# Patient Record
Sex: FEMALE | Race: OTHER
[De-identification: names, ages, dates, MRNs, and addresses within clinical notes are randomized per-mention and may not be internally consistent; named-entity substitution may affect disease eponyms.]

---

## 2020-03-27 ENCOUNTER — HOSPITAL ENCOUNTER (EMERGENCY)
Dept: HOSPITAL 60 - LB.ED | Age: 70
Discharge: HOME | End: 2020-03-27
Payer: MEDICARE

## 2020-03-27 VITALS — HEART RATE: 50 BPM | SYSTOLIC BLOOD PRESSURE: 129 MMHG | DIASTOLIC BLOOD PRESSURE: 60 MMHG

## 2020-03-27 DIAGNOSIS — R07.9: Primary | ICD-10-CM

## 2020-03-27 DIAGNOSIS — Z79.899: ICD-10-CM

## 2020-03-27 DIAGNOSIS — R07.89: ICD-10-CM

## 2020-03-27 RX ADMIN — NITROGLYCERIN ONE MG: 0.4 TABLET SUBLINGUAL at 07:54

## 2020-03-27 RX ADMIN — NITROGLYCERIN ONE MG: 0.4 TABLET SUBLINGUAL at 07:44

## 2020-03-27 RX ADMIN — LORAZEPAM ONE MG: 2 INJECTION INTRAMUSCULAR; INTRAVENOUS at 08:02

## 2020-03-27 NOTE — EDM.PDOC
ED HPI GENERAL MEDICAL PROBLEM





- General


Chief Complaint: Chest Pain


Stated Complaint: CHEST PAIN


Time Seen by Provider: 03/27/20 07:45


Source of Information: Reports: Patient


History Limitations: Reports: No Limitations





- History of Present Illness


INITIAL COMMENTS - FREE TEXT/NARRATIVE: 





This patient presents to the ED for evaluation of chest pain. She states that 

she was sitting in her chair watching the news this morning at 0630 when she 

developed a sharp pain in her back and chest. She states the pain is the worst 

that she has had and has not changed in intensity. On arrival she rates the 

pain at 10/10 and continues primary in her left anterior chest and some in her 

back. It does not radiate up her neck or down her arms. She felt a bit short of 

breath but denies any nausea, vomiting, or diarrhea. She denies recent illness 

including fever, cough, sore throat. She does take HCTZ for "swelling in my 

ankles" but denies any health problems. Patient is a heavy smoker at 1.5 packs 

per day.


Onset: Today, Sudden


Onset Date: 03/27/20


Onset Time: 06:30


Duration: Heavy


Location: Reports: Chest


Quality: Reports: Stabbing


Severity: Severe


Improves with: Reports: None


Worsens with: Reports: None


Associated Symptoms: Reports: No Other Symptoms





- Related Data


 Allergies











Allergy/AdvReac Type Severity Reaction Status Date / Time


 


No Known Allergies Allergy   Verified 03/27/20 08:30











Home Meds: 


 Home Meds





Hydrochlorothiazide 25 mg PO DAILY 02/06/15 [History]











Past Medical History


Musculoskeletal History: Reports: Arthritis





- Past Surgical History


Musculoskeletal Surgical History: Reports: Shoulder Surgery


Other Musculoskeletal Surgeries/Procedures:: Right RTC repair 3/2017





ED ROS GENERAL





- Review of Systems


Review Of Systems: See Below


Constitutional: Denies: Fever, Malaise, Weakness, Fatigue


HEENT: Denies: Ear Pain, Eye Pain, Throat Pain


Respiratory: Reports: Shortness of Breath.  Denies: Cough, Sputum


Cardiovascular: Reports: Chest Pain, Blood Pressure Problem, Dyspnea on 

Exertion.  Denies: Edema, Palpitations


GI/Abdominal: Denies: Abdominal Pain, Diarrhea, Nausea, Vomiting


Musculoskeletal: Reports: No Symptoms


Skin: Reports: No Symptoms


Neurological: Reports: No Symptoms





ED EXAM, GENERAL





- Physical Exam


Exam: See Below


Exam Limited By: No Limitations


General Appearance: Alert, WD/WN, Anxious, Moderate Distress


Eye Exam: Bilateral Eye: PERRL


Ears: Normal External Exam


Nose: Normal Inspection


Throat/Mouth: Normal Inspection


Head: Atraumatic, Normocephalic


Neck: Normal Inspection, Supple, Non-Tender, Full Range of Motion


Respiratory/Chest: No Respiratory Distress, Lungs Clear, Normal Breath Sounds


Cardiovascular: Normal Peripheral Pulses, Regular Rate, Rhythm


Extremities: Normal Inspection


Neurological: Alert, Oriented





Course





- Vital Signs


Last Recorded V/S: 


 Last Vital Signs











Temp  36.9 C   03/27/20 07:43


 


Pulse  50 L  03/27/20 10:42


 


Resp  20   03/27/20 10:42


 


BP  129/60   03/27/20 10:42


 


Pulse Ox  94 L  03/27/20 08:17














- Orders/Labs/Meds


Orders: 


 Active Orders 24 hr











 Category Date Time Status


 


 EKG Documentation Completion [RC] ASDIRECTED Care  03/27/20 07:54 Active


 


 EKG Documentation Completion [RC] ASDIRECTED Care  03/27/20 10:27 Active


 


 Chest w Cont [CT] Stat Exams  03/27/20 08:23 Taken


 


 Iodixanol [Visipaque 320] Med  03/27/20 08:44 Active





 100 ml IV .AS DIRECTED PRN   


 


 Sodium Chloride 0.9% [Normal Saline] Med  03/27/20 08:45 Active





 50 ml FLUSH ONETIME   


 


 Sodium Chloride 0.9% [Normal Saline] 1,000 ml Med  03/27/20 08:10 Active





 IV ASDIRECTED   








 Medication Orders





Sodium Chloride (Normal Saline)  1,000 mls @ 0 mls/hr IV ASDIRECTED KAR


   Last Admin: 03/27/20 08:00  Dose: 30 mls/hr


Iodixanol (Visipaque 320)  100 ml IV .AS DIRECTED PRN


   PRN Reason: RADIOLOGY EXAM


   Stop: 03/28/20 08:45


Sodium Chloride (Normal Saline)  50 ml FLUSH ONETIME [facility]








Labs: 


 Laboratory Tests











  03/27/20 03/27/20 03/27/20 Range/Units





  07:54 07:54 10:45 


 


WBC  7.1    (4.0-11.0)  K/uL


 


RBC  4.71    (3.80-5.80)  M/uL


 


Hgb  13.2    (11.5-16.5)  g/dL


 


Hct  40.9    (37.0-47.0)  %


 


MCV  87    (76-96)  fL


 


MCH  28.0    (27.0-32.0)  pg


 


MCHC  32.3    (31.0-35.0)  g/dL


 


RDW  15.8    (11.0-16.0)  %


 


Plt Count  250    (150-500)  K/uL


 


MPV  9.2    (6.0-10.0)  fL


 


Neut % (Auto)  55.6    (45.0-70.0)  %


 


Lymph % (Auto)  30.1    (20.0-40.0)  %


 


Mono % (Auto)  12.6 H    (3.0-10.0)  %


 


Eos % (Auto)  1.4    (1.0-5.0)  %


 


Baso % (Auto)  0.3    (0.0-0.5)  %


 


Neut # (Auto)  3.93    (2.00-7.50)  K/uL


 


Lymph # (Auto)  2.13    (1.50-4.00)  K/uL


 


Mono # (Auto)  0.89 H    (0.20-0.80)  K/uL


 


Eos # (Auto)  0.10    (0.04-0.40)  K/uL


 


Baso # (Auto)  0.02    (0.02-0.10)  K/uL


 


Sodium   139   (136-145)  mmol/L


 


Potassium   3.5   (3.5-5.1)  mmol/L


 


Chloride   101   ()  mmol/L


 


Carbon Dioxide   27.8   (21.0-32.0)  mmol/L


 


Anion Gap   13.7   (5.0-15.0)  mmol/L


 


BUN   23   (8-26)  mg/dL


 


Creatinine   0.78   (0.55-1.02)  mg/dL


 


Est Cr Clr Drug Dosing   TNP   


 


Estimated GFR (MDRD)   > 60   (>60)  MLS/MIN


 


BUN/Creatinine Ratio   29.5 H   (6-25)  


 


Glucose   84   ()  mg/dL


 


Calcium   9.1   (8.5-10.1)  mg/dL


 


Troponin I   < 0.017  < 0.017  (0.000-0.060)  ng/mL











Meds: 


Medications











Generic Name Dose Route Start Last Admin





  Trade Name Freq  PRN Reason Stop Dose Admin


 


Sodium Chloride  1,000 mls @ 0 mls/hr  03/27/20 08:10  03/27/20 08:00





  Normal Saline  IV   30 mls/hr





  ASDIRECTED KAR   Administration





     





     





     





  KVO   


 


Iodixanol  100 ml  03/27/20 08:44  





  Visipaque 320  IV  03/28/20 08:45  





  .AS DIRECTED PRN   





  RADIOLOGY EXAM   





     





     





     


 


Sodium Chloride  50 ml  03/27/20 08:45  





  Normal Saline  FLUSH   





  ONETIME KAR   





     





     





     





     














Discontinued Medications














Generic Name Dose Route Start Last Admin





  Trade Name Fe  PRN Reason Stop Dose Admin


 


Aspirin  324 mg  03/27/20 07:56  03/27/20 07:53





  Aspirin  PO  03/27/20 07:57  324 mg





  ONETIME ONE   Administration





     





     





     





     


 


Lorazepam  1 mg  03/27/20 07:55  03/27/20 08:02





  Ativan  IVPUSH  03/27/20 07:56  1 mg





  ONETIME ONE   Administration





     





     





     





     


 


Nitroglycerin  0.4 mg  03/27/20 07:44  03/27/20 07:44





  Nitrostat  SL  03/27/20 07:45  0.4 mg





  ONETIME ONE   Administration





     





     





     





     


 


Nitroglycerin  0.4 mg  03/27/20 07:56  03/27/20 07:54





  Nitrostat  SL  03/27/20 07:57  0.4 mg





  ONETIME ONE   Administration





     





     





     





     














- Re-Assessments/Exams


Free Text/Narrative Re-Assessment/Exam: 





03/27/20 13:13


This patient presents to the ED for evaluation of chest pain. Initial 

laboratory and imaging tests have come back normal without progression of 

symptoms or other new concerning symptoms. There is no clinical, laboratory, or 

radiographic evidence of pulmonary embolism, aortic dissection, pneumonia, 

pneumothorax or cardiac ischemia. Other etiologies of chest pain could include 

chest wall source, esophageal spasm or GI source, pleuritis, referred pain, 

etc. I have no suspicion of angina at this point and would not admit at this 

time. She was completely pain free after interventions in the ED and repeat 

troponin and EKG were negative. I will  set the patient up for an outpatient 

stress echocardiogram as soon as possible. She was instructed to return 

immediately to the ED should the pain recur; she was also reminded to use 911 

as needed. The patient agrees with the plan and all questions were answered.





Departure





- Departure


Time of Disposition: 12:00


Disposition: Home, Self-Care 01


Condition: Good


Clinical Impression: 


 Chest pain





Instructions:  Nonspecific Chest Pain, Adult


Referrals: 


PCP,None [Primary Care Provider] - 


Forms:  ED Department Discharge





Sepsis Event Note





- Focused Exam


Vital Signs: 


 Vital Signs











  Temp Pulse Resp BP BP Pulse Ox


 


 03/27/20 10:42   50 L  20   129/60 


 


 03/27/20 08:17   72  18   150/69 H  94 L


 


 03/27/20 08:05   60  20   144/62 H  97


 


 03/27/20 07:58   63  20   151/74 H  97


 


 03/27/20 07:54     144/116 H  


 


 03/27/20 07:51   93  20   144/116 H  97


 


 03/27/20 07:44     194/95 H  


 


 03/27/20 07:43  36.9 C  86  20   194/95 H  99


 


 03/27/20 07:40  36.1 C  84  20   194/95 H  100











Date Exam was Performed: 03/27/20


Time Exam was Performed: 13:13





- My Orders


Last 24 Hours: 


My Active Orders





03/27/20 07:54


EKG Documentation Completion [RC] ASDIRECTED 





03/27/20 08:10


Sodium Chloride 0.9% [Normal Saline] 1,000 ml IV ASDIRECTED 





03/27/20 08:23


Chest w Cont [CT] Stat 





03/27/20 08:44


Iodixanol [Visipaque 320]   100 ml IV .AS DIRECTED PRN 





03/27/20 08:45


Sodium Chloride 0.9% [Normal Saline]   50 ml FLUSH ONETIME 





03/27/20 10:27


EKG Documentation Completion [RC] ASDIRECTED 














- Assessment/Plan


Last 24 Hours: 


My Active Orders





03/27/20 07:54


EKG Documentation Completion [RC] ASDIRECTED 





03/27/20 08:10


Sodium Chloride 0.9% [Normal Saline] 1,000 ml IV ASDIRECTED 





03/27/20 08:23


Chest w Cont [CT] Stat 





03/27/20 08:44


Iodixanol [Visipaque 320]   100 ml IV .AS DIRECTED PRN 





03/27/20 08:45


Sodium Chloride 0.9% [Normal Saline]   50 ml FLUSH ONETIME 





03/27/20 10:27


EKG Documentation Completion [RC] ASDIRECTED
None needed

## 2022-09-02 ENCOUNTER — HOSPITAL ENCOUNTER (OUTPATIENT)
Dept: HOSPITAL 60 - LB.ED | Age: 72
Setting detail: OBSERVATION
LOS: 13 days | Discharge: HOME | End: 2022-09-15
Attending: NURSE PRACTITIONER | Admitting: NURSE PRACTITIONER
Payer: MEDICARE

## 2022-09-02 DIAGNOSIS — Z20.822: ICD-10-CM

## 2022-09-02 DIAGNOSIS — F03.90: Primary | ICD-10-CM

## 2022-09-02 DIAGNOSIS — Z98.890: ICD-10-CM

## 2022-09-02 DIAGNOSIS — Z79.899: ICD-10-CM

## 2022-09-02 DIAGNOSIS — I10: ICD-10-CM

## 2022-09-02 DIAGNOSIS — E87.6: ICD-10-CM

## 2022-09-02 DIAGNOSIS — R44.2: ICD-10-CM

## 2022-09-02 PROCEDURE — U0002 COVID-19 LAB TEST NON-CDC: HCPCS

## 2022-09-03 RX ADMIN — Medication SCH TAB: at 08:22

## 2022-09-04 RX ADMIN — LORAZEPAM ONE MG: 2 INJECTION INTRAMUSCULAR; INTRAVENOUS at 10:40

## 2022-09-04 RX ADMIN — Medication SCH TAB: at 08:07

## 2022-09-04 RX ADMIN — LORAZEPAM ONE: 2 INJECTION INTRAMUSCULAR; INTRAVENOUS at 11:53

## 2022-09-05 RX ADMIN — Medication SCH TAB: at 08:22

## 2022-09-06 RX ADMIN — Medication SCH TAB: at 07:06

## 2022-09-07 RX ADMIN — LORAZEPAM PRN MG: 2 INJECTION INTRAMUSCULAR; INTRAVENOUS at 05:25

## 2022-09-07 RX ADMIN — Medication SCH TAB: at 08:13

## 2022-09-08 RX ADMIN — LORAZEPAM PRN MG: 2 INJECTION INTRAMUSCULAR; INTRAVENOUS at 08:59

## 2022-09-08 RX ADMIN — Medication SCH TAB: at 08:22

## 2022-09-09 RX ADMIN — Medication SCH TAB: at 08:03

## 2022-09-10 RX ADMIN — Medication SCH TAB: at 07:47

## 2022-09-11 RX ADMIN — Medication SCH TAB: at 07:53

## 2022-09-12 RX ADMIN — Medication SCH TAB: at 07:56

## 2022-09-13 RX ADMIN — Medication SCH TAB: at 07:59

## 2022-09-14 RX ADMIN — Medication SCH TAB: at 07:39

## 2022-09-15 VITALS — DIASTOLIC BLOOD PRESSURE: 77 MMHG | SYSTOLIC BLOOD PRESSURE: 162 MMHG | HEART RATE: 71 BPM

## 2022-09-15 RX ADMIN — Medication SCH TAB: at 07:17
